# Patient Record
Sex: FEMALE | Race: WHITE | NOT HISPANIC OR LATINO
[De-identification: names, ages, dates, MRNs, and addresses within clinical notes are randomized per-mention and may not be internally consistent; named-entity substitution may affect disease eponyms.]

---

## 2017-01-30 ENCOUNTER — APPOINTMENT (OUTPATIENT)
Dept: UROGYNECOLOGY | Facility: CLINIC | Age: 42
End: 2017-01-30

## 2017-02-01 ENCOUNTER — RESULT REVIEW (OUTPATIENT)
Age: 42
End: 2017-02-01

## 2017-02-01 LAB — BACTERIA UR CULT: NORMAL

## 2017-02-08 ENCOUNTER — OUTPATIENT (OUTPATIENT)
Dept: OUTPATIENT SERVICES | Facility: HOSPITAL | Age: 42
LOS: 1 days | End: 2017-02-08

## 2017-02-08 VITALS
RESPIRATION RATE: 16 BRPM | HEIGHT: 66.5 IN | TEMPERATURE: 98 F | WEIGHT: 162.04 LBS | DIASTOLIC BLOOD PRESSURE: 70 MMHG | HEART RATE: 76 BPM | SYSTOLIC BLOOD PRESSURE: 108 MMHG

## 2017-02-08 DIAGNOSIS — N39.3 STRESS INCONTINENCE (FEMALE) (MALE): ICD-10-CM

## 2017-02-08 LAB
BASOPHILS # BLD AUTO: 0.03 K/UL — SIGNIFICANT CHANGE UP (ref 0–0.2)
BASOPHILS NFR BLD AUTO: 0.5 % — SIGNIFICANT CHANGE UP (ref 0–2)
BLD GP AB SCN SERPL QL: NEGATIVE — SIGNIFICANT CHANGE UP
EOSINOPHIL # BLD AUTO: 0.16 K/UL — SIGNIFICANT CHANGE UP (ref 0–0.5)
EOSINOPHIL NFR BLD AUTO: 2.5 % — SIGNIFICANT CHANGE UP (ref 0–6)
HCG SERPL-ACNC: < 5 MIU/ML — SIGNIFICANT CHANGE UP
HCT VFR BLD CALC: 37.8 % — SIGNIFICANT CHANGE UP (ref 34.5–45)
HGB BLD-MCNC: 13.1 G/DL — SIGNIFICANT CHANGE UP (ref 11.5–15.5)
IMM GRANULOCYTES NFR BLD AUTO: 0.3 % — SIGNIFICANT CHANGE UP (ref 0–1.5)
LYMPHOCYTES # BLD AUTO: 2.52 K/UL — SIGNIFICANT CHANGE UP (ref 1–3.3)
LYMPHOCYTES # BLD AUTO: 39.8 % — SIGNIFICANT CHANGE UP (ref 13–44)
MCHC RBC-ENTMCNC: 32 PG — SIGNIFICANT CHANGE UP (ref 27–34)
MCHC RBC-ENTMCNC: 34.7 % — SIGNIFICANT CHANGE UP (ref 32–36)
MCV RBC AUTO: 92.4 FL — SIGNIFICANT CHANGE UP (ref 80–100)
MONOCYTES # BLD AUTO: 0.27 K/UL — SIGNIFICANT CHANGE UP (ref 0–0.9)
MONOCYTES NFR BLD AUTO: 4.3 % — SIGNIFICANT CHANGE UP (ref 2–14)
NEUTROPHILS # BLD AUTO: 3.33 K/UL — SIGNIFICANT CHANGE UP (ref 1.8–7.4)
NEUTROPHILS NFR BLD AUTO: 52.6 % — SIGNIFICANT CHANGE UP (ref 43–77)
PLATELET # BLD AUTO: 255 K/UL — SIGNIFICANT CHANGE UP (ref 150–400)
PMV BLD: 11.3 FL — SIGNIFICANT CHANGE UP (ref 7–13)
RBC # BLD: 4.09 M/UL — SIGNIFICANT CHANGE UP (ref 3.8–5.2)
RBC # FLD: 12.6 % — SIGNIFICANT CHANGE UP (ref 10.3–14.5)
RH IG SCN BLD-IMP: POSITIVE — SIGNIFICANT CHANGE UP
WBC # BLD: 6.33 K/UL — SIGNIFICANT CHANGE UP (ref 3.8–10.5)
WBC # FLD AUTO: 6.33 K/UL — SIGNIFICANT CHANGE UP (ref 3.8–10.5)

## 2017-02-08 RX ORDER — SODIUM CHLORIDE 9 MG/ML
1000 INJECTION, SOLUTION INTRAVENOUS
Qty: 0 | Refills: 0 | Status: DISCONTINUED | OUTPATIENT
Start: 2017-02-16 | End: 2017-02-17

## 2017-02-08 NOTE — H&P PST ADULT - PMH
Anemia  Dx 1/10 Rx with Iron   ( R/t Fibroid)  Eczema  Hands ,face --seasonal  History of Varicose Veins  both legs  HPV (Human Papillomavirus)  Dx 5 yrs ago  Murmur, Heart  Dx 10 yrs ago.  asymptomatic  Sinus headaches Anemia  Dx 1/10 Rx with Iron   ( R/t Fibroid)  Eczema  Hands ,face --seasonal  History of Varicose Veins  both legs  HPV (Human Papillomavirus)  Dx 5 yrs ago  Murmur, Heart  Dx 10 yrs ago.  asymptomatic  Sinus headaches    Stress incontinence in female

## 2017-02-08 NOTE — H&P PST ADULT - PSH
Abnormal Cervical Cytology  Dx with HPV and Sx done 2004 by Dr. Richards.  s/p cryosurgery  S/P Bunionectomy  L foot 2002  S/P myomectomy  2009 Abnormal Cervical Cytology  Dx with HPV and Sx done 2004 by Dr. Richards.  s/p cryosurgery at that time  S/P Bunionectomy  L foot 2002  S/P myomectomy  2009

## 2017-02-08 NOTE — H&P PST ADULT - ASSESSMENT
42 y/o female with hx of stress incontinence x 1 year,  which has  been worsening. Dx with stress incontinence scheduled for midurethral sling, cystoscopy 2/16/17.

## 2017-02-08 NOTE — H&P PST ADULT - GENITOURINARY COMMENTS
hx of stress incontinence x 1 year, and has recently been worsening. Dx with stress incontinence scheduled for midurethral sling, cystoscopy 2/16/17.

## 2017-02-08 NOTE — H&P PST ADULT - NSANTHOSAYNRD_GEN_A_CORE
No. DAVID screening performed.  STOP BANG Legend: 0-2 = LOW Risk; 3-4 = INTERMEDIATE Risk; 5-8 = HIGH Risk

## 2017-02-08 NOTE — H&P PST ADULT - HISTORY OF PRESENT ILLNESS
42 y/o female with hx of stress incontinence x 1 year, and has recently been worsening. Dx with stress incontinence scheduled for midurethral sling, cystoscopy 2/16/17. 42 y/o female with hx of stress incontinence x 1 year,  which has  been worsening. Dx with stress incontinence scheduled for midurethral sling, cystoscopy 2/16/17.

## 2017-02-15 NOTE — ASU PATIENT PROFILE, ADULT - PSH
Abnormal Cervical Cytology  Dx with HPV and Sx done 2004 by Dr. Richards.  s/p cryosurgery at that time  S/P Bunionectomy  L foot 2002  S/P myomectomy  2009

## 2017-02-15 NOTE — ASU PATIENT PROFILE, ADULT - PMH
Anemia  Dx 1/10 Rx with Iron   ( R/t Fibroid)  Eczema  Hands ,face --seasonal  History of Varicose Veins  both legs  HPV (Human Papillomavirus)  Dx 5 yrs ago  Murmur, Heart  Dx 10 yrs ago.  asymptomatic  Sinus headaches    Stress incontinence in female

## 2017-02-16 ENCOUNTER — TRANSCRIPTION ENCOUNTER (OUTPATIENT)
Age: 42
End: 2017-02-16

## 2017-02-16 ENCOUNTER — APPOINTMENT (OUTPATIENT)
Dept: UROGYNECOLOGY | Facility: HOSPITAL | Age: 42
End: 2017-02-16

## 2017-02-16 ENCOUNTER — OUTPATIENT (OUTPATIENT)
Dept: OUTPATIENT SERVICES | Facility: HOSPITAL | Age: 42
LOS: 1 days | Discharge: HOME | End: 2017-02-16

## 2017-02-16 VITALS
RESPIRATION RATE: 18 BRPM | HEART RATE: 77 BPM | DIASTOLIC BLOOD PRESSURE: 63 MMHG | OXYGEN SATURATION: 98 % | TEMPERATURE: 98 F | SYSTOLIC BLOOD PRESSURE: 106 MMHG

## 2017-02-16 VITALS
DIASTOLIC BLOOD PRESSURE: 56 MMHG | SYSTOLIC BLOOD PRESSURE: 111 MMHG | TEMPERATURE: 98 F | WEIGHT: 162.04 LBS | RESPIRATION RATE: 16 BRPM | HEIGHT: 66.5 IN | HEART RATE: 71 BPM | OXYGEN SATURATION: 97 %

## 2017-02-16 DIAGNOSIS — N39.3 STRESS INCONTINENCE (FEMALE) (MALE): ICD-10-CM

## 2017-02-16 RX ORDER — FENTANYL CITRATE 50 UG/ML
25 INJECTION INTRAVENOUS
Qty: 0 | Refills: 0 | Status: DISCONTINUED | OUTPATIENT
Start: 2017-02-16 | End: 2017-02-17

## 2017-02-16 RX ORDER — OXYCODONE HYDROCHLORIDE 5 MG/1
5 TABLET ORAL EVERY 4 HOURS
Qty: 0 | Refills: 0 | Status: DISCONTINUED | OUTPATIENT
Start: 2017-02-16 | End: 2017-02-17

## 2017-02-16 RX ORDER — SODIUM CHLORIDE 9 MG/ML
1000 INJECTION, SOLUTION INTRAVENOUS
Qty: 0 | Refills: 0 | Status: DISCONTINUED | OUTPATIENT
Start: 2017-02-16 | End: 2017-02-17

## 2017-02-16 RX ORDER — ONDANSETRON 8 MG/1
4 TABLET, FILM COATED ORAL ONCE
Qty: 0 | Refills: 0 | Status: DISCONTINUED | OUTPATIENT
Start: 2017-02-16 | End: 2017-02-17

## 2017-02-16 RX ORDER — FENTANYL CITRATE 50 UG/ML
50 INJECTION INTRAVENOUS
Qty: 0 | Refills: 0 | Status: DISCONTINUED | OUTPATIENT
Start: 2017-02-16 | End: 2017-02-17

## 2017-02-16 RX ADMIN — SODIUM CHLORIDE 30 MILLILITER(S): 9 INJECTION, SOLUTION INTRAVENOUS at 07:44

## 2017-02-16 NOTE — ASU DISCHARGE PLAN (ADULT/PEDIATRIC). - MEDICATION SUMMARY - MEDICATIONS TO TAKE
I will START or STAY ON the medications listed below when I get home from the hospital:    hydrocortisone topical cream  --   apply on skin to effected area of skin prn  -- Indication: For home med    Motrin 800 mg oral tablet  -- 1 tab(s) by mouth 3 times a day  -- Indication: For pain    Aleve 220 mg oral tablet  --  by mouth 1x/day. last dose 2/8/17  -- Indication: For home med     multivitamin  --   1 tab oral dakily. last dose 2/8/17  -- Indication: For home med

## 2017-02-16 NOTE — ASU DISCHARGE PLAN (ADULT/PEDIATRIC). - NURSING INSTRUCTIONS
Tylenol 1000 mg every 6 hours as needed for pain may take at 1:55 pm or motrin 800 mg every 6 hours as needed may take at 3:15 pm Tylenol 1000 mg every 6 hours as needed for pain may take at 1:55 pm or motrin 800 mg every 6 hours as needed may take at 3:15 pm do not take motrin with aleve

## 2017-03-06 ENCOUNTER — APPOINTMENT (OUTPATIENT)
Dept: UROGYNECOLOGY | Facility: CLINIC | Age: 42
End: 2017-03-06

## 2017-04-11 ENCOUNTER — APPOINTMENT (OUTPATIENT)
Dept: UROGYNECOLOGY | Facility: CLINIC | Age: 42
End: 2017-04-11

## 2017-04-11 DIAGNOSIS — N95.2 POSTMENOPAUSAL ATROPHIC VAGINITIS: ICD-10-CM

## 2017-04-24 ENCOUNTER — APPOINTMENT (OUTPATIENT)
Dept: UROGYNECOLOGY | Facility: CLINIC | Age: 42
End: 2017-04-24

## 2017-05-12 ENCOUNTER — APPOINTMENT (OUTPATIENT)
Dept: UROGYNECOLOGY | Facility: CLINIC | Age: 42
End: 2017-05-12

## 2017-06-07 ENCOUNTER — OUTPATIENT (OUTPATIENT)
Dept: OUTPATIENT SERVICES | Facility: HOSPITAL | Age: 42
LOS: 1 days | End: 2017-06-07
Payer: COMMERCIAL

## 2017-06-07 VITALS
OXYGEN SATURATION: 98 % | TEMPERATURE: 98 F | HEART RATE: 84 BPM | DIASTOLIC BLOOD PRESSURE: 60 MMHG | WEIGHT: 160.06 LBS | SYSTOLIC BLOOD PRESSURE: 110 MMHG | RESPIRATION RATE: 16 BRPM | HEIGHT: 67 IN

## 2017-06-07 DIAGNOSIS — N39.3 STRESS INCONTINENCE (FEMALE) (MALE): ICD-10-CM

## 2017-06-07 DIAGNOSIS — Z41.9 ENCOUNTER FOR PROCEDURE FOR PURPOSES OTHER THAN REMEDYING HEALTH STATE, UNSPECIFIED: Chronic | ICD-10-CM

## 2017-06-07 DIAGNOSIS — Z01.818 ENCOUNTER FOR OTHER PREPROCEDURAL EXAMINATION: ICD-10-CM

## 2017-06-07 DIAGNOSIS — T19.2XXA FOREIGN BODY IN VULVA AND VAGINA, INITIAL ENCOUNTER: ICD-10-CM

## 2017-06-07 LAB
HCT VFR BLD CALC: 36.3 % — SIGNIFICANT CHANGE UP (ref 34.5–45)
HGB BLD-MCNC: 12.5 G/DL — SIGNIFICANT CHANGE UP (ref 11.5–15.5)
MCHC RBC-ENTMCNC: 32.1 PG — SIGNIFICANT CHANGE UP (ref 27–34)
MCHC RBC-ENTMCNC: 34.4 GM/DL — SIGNIFICANT CHANGE UP (ref 32–36)
MCV RBC AUTO: 93.1 FL — SIGNIFICANT CHANGE UP (ref 80–100)
PLATELET # BLD AUTO: 225 K/UL — SIGNIFICANT CHANGE UP (ref 150–400)
RBC # BLD: 3.9 M/UL — SIGNIFICANT CHANGE UP (ref 3.8–5.2)
RBC # FLD: 12.8 % — SIGNIFICANT CHANGE UP (ref 10.3–14.5)
WBC # BLD: 7.15 K/UL — SIGNIFICANT CHANGE UP (ref 3.8–10.5)
WBC # FLD AUTO: 7.15 K/UL — SIGNIFICANT CHANGE UP (ref 3.8–10.5)

## 2017-06-07 PROCEDURE — 87086 URINE CULTURE/COLONY COUNT: CPT

## 2017-06-07 PROCEDURE — 85027 COMPLETE CBC AUTOMATED: CPT

## 2017-06-07 PROCEDURE — G0463: CPT

## 2017-06-07 RX ORDER — IBUPROFEN 200 MG
1 TABLET ORAL
Qty: 0 | Refills: 0 | COMMUNITY

## 2017-06-07 RX ORDER — CEFAZOLIN SODIUM 1 G
2000 VIAL (EA) INJECTION ONCE
Qty: 0 | Refills: 0 | Status: DISCONTINUED | OUTPATIENT
Start: 2017-06-12 | End: 2017-06-27

## 2017-06-07 NOTE — H&P PST ADULT - PSH
Abnormal Cervical Cytology  Dx with HPV and Sx done 2004 by Dr. Richards.  s/p cryosurgery at that time  Elective surgery  vaginal mesh 2/2017  S/P Bunionectomy  L foot 2002  S/P myomectomy  2009

## 2017-06-07 NOTE — H&P PST ADULT - HISTORY OF PRESENT ILLNESS
42 y/o female with hx of stress incontinence x 1 year,  which has  been worsening. Dx with stress incontinence scheduled for midurethral sling, cystoscopy 2/16/17. 40 y/o female with hx of stress incontinence  s/p  midurethral sling, cystoscopy 2/16/17; according to pt sling is sticking out denies any symptoms presents to PST for cystoscopy vaginal mesh revision

## 2017-06-08 LAB
CULTURE RESULTS: NO GROWTH — SIGNIFICANT CHANGE UP
SPECIMEN SOURCE: SIGNIFICANT CHANGE UP

## 2017-06-12 ENCOUNTER — RESULT REVIEW (OUTPATIENT)
Age: 42
End: 2017-06-12

## 2017-06-12 ENCOUNTER — TRANSCRIPTION ENCOUNTER (OUTPATIENT)
Age: 42
End: 2017-06-12

## 2017-06-12 ENCOUNTER — OUTPATIENT (OUTPATIENT)
Dept: OUTPATIENT SERVICES | Facility: HOSPITAL | Age: 42
LOS: 1 days | End: 2017-06-12
Payer: COMMERCIAL

## 2017-06-12 ENCOUNTER — APPOINTMENT (OUTPATIENT)
Dept: UROGYNECOLOGY | Facility: HOSPITAL | Age: 42
End: 2017-06-12

## 2017-06-12 VITALS
OXYGEN SATURATION: 100 % | HEART RATE: 68 BPM | TEMPERATURE: 97 F | RESPIRATION RATE: 16 BRPM | DIASTOLIC BLOOD PRESSURE: 57 MMHG | SYSTOLIC BLOOD PRESSURE: 95 MMHG

## 2017-06-12 VITALS
TEMPERATURE: 98 F | HEART RATE: 66 BPM | SYSTOLIC BLOOD PRESSURE: 102 MMHG | RESPIRATION RATE: 16 BRPM | DIASTOLIC BLOOD PRESSURE: 70 MMHG | WEIGHT: 160.06 LBS | HEIGHT: 67 IN | OXYGEN SATURATION: 100 %

## 2017-06-12 DIAGNOSIS — Z01.818 ENCOUNTER FOR OTHER PREPROCEDURAL EXAMINATION: ICD-10-CM

## 2017-06-12 DIAGNOSIS — T19.2XXA FOREIGN BODY IN VULVA AND VAGINA, INITIAL ENCOUNTER: ICD-10-CM

## 2017-06-12 DIAGNOSIS — Z41.9 ENCOUNTER FOR PROCEDURE FOR PURPOSES OTHER THAN REMEDYING HEALTH STATE, UNSPECIFIED: Chronic | ICD-10-CM

## 2017-06-12 LAB — HCG UR QL: NEGATIVE — SIGNIFICANT CHANGE UP

## 2017-06-12 PROCEDURE — 81025 URINE PREGNANCY TEST: CPT

## 2017-06-12 PROCEDURE — 88300 SURGICAL PATH GROSS: CPT

## 2017-06-12 PROCEDURE — 88300 SURGICAL PATH GROSS: CPT | Mod: 26

## 2017-06-12 PROCEDURE — 57295 REVISE VAG GRAFT VIA VAGINA: CPT

## 2017-06-12 RX ORDER — ACETAMINOPHEN 500 MG
1000 TABLET ORAL ONCE
Qty: 0 | Refills: 0 | Status: DISCONTINUED | OUTPATIENT
Start: 2017-06-12 | End: 2017-06-27

## 2017-06-12 RX ORDER — HYDROMORPHONE HYDROCHLORIDE 2 MG/ML
0.5 INJECTION INTRAMUSCULAR; INTRAVENOUS; SUBCUTANEOUS
Qty: 0 | Refills: 0 | Status: DISCONTINUED | OUTPATIENT
Start: 2017-06-12 | End: 2017-06-12

## 2017-06-12 RX ORDER — SODIUM CHLORIDE 9 MG/ML
3 INJECTION INTRAMUSCULAR; INTRAVENOUS; SUBCUTANEOUS EVERY 8 HOURS
Qty: 0 | Refills: 0 | Status: DISCONTINUED | OUTPATIENT
Start: 2017-06-12 | End: 2017-06-12

## 2017-06-12 RX ORDER — SODIUM CHLORIDE 9 MG/ML
1000 INJECTION, SOLUTION INTRAVENOUS
Qty: 0 | Refills: 0 | Status: DISCONTINUED | OUTPATIENT
Start: 2017-06-12 | End: 2017-06-27

## 2017-06-12 RX ORDER — OXYCODONE HYDROCHLORIDE 5 MG/1
5 TABLET ORAL EVERY 4 HOURS
Qty: 0 | Refills: 0 | Status: DISCONTINUED | OUTPATIENT
Start: 2017-06-12 | End: 2017-06-12

## 2017-06-12 RX ORDER — ONDANSETRON 8 MG/1
4 TABLET, FILM COATED ORAL ONCE
Qty: 0 | Refills: 0 | Status: DISCONTINUED | OUTPATIENT
Start: 2017-06-12 | End: 2017-06-12

## 2017-06-12 RX ORDER — IBUPROFEN 200 MG
1 TABLET ORAL
Qty: 30 | Refills: 0 | OUTPATIENT
Start: 2017-06-12 | End: 2017-06-22

## 2017-06-12 RX ADMIN — SODIUM CHLORIDE 125 MILLILITER(S): 9 INJECTION, SOLUTION INTRAVENOUS at 09:38

## 2017-06-12 RX ADMIN — SODIUM CHLORIDE 3 MILLILITER(S): 9 INJECTION INTRAMUSCULAR; INTRAVENOUS; SUBCUTANEOUS at 05:51

## 2017-06-12 NOTE — ASU DISCHARGE PLAN (ADULT/PEDIATRIC). - MEDICATION SUMMARY - MEDICATIONS TO STOP TAKING
I will STOP taking the medications listed below when I get home from the hospital:    Aleve 220 mg oral tablet  --  by mouth 1x/day. last dose 2/8/17

## 2017-06-12 NOTE — BRIEF OPERATIVE NOTE - OPERATION/FINDINGS
1mm mesh exposure 1.5 cm opening in vaginal incision  normal cystoscopy, no evidence of mesh in bladder or urethra

## 2017-06-12 NOTE — ASU DISCHARGE PLAN (ADULT/PEDIATRIC). - MEDICATION SUMMARY - MEDICATIONS TO TAKE
I will START or STAY ON the medications listed below when I get home from the hospital:    ibuprofen 800 mg oral tablet  -- 1 tab(s) by mouth 3 times a day  -- Do not take this drug if you are pregnant.  It is very important that you take or use this exactly as directed.  Do not skip doses or discontinue unless directed by your doctor.  May cause drowsiness or dizziness.  Obtain medical advice before taking any non-prescription drugs as some may affect the action of this medication.  Take with food or milk.    -- Indication: For pain

## 2017-06-12 NOTE — ASU DISCHARGE PLAN (ADULT/PEDIATRIC). - NOTIFY
Numbness, tingling/Increased Irritability or Sluggishness/Unable to Urinate/Swelling that continues/GYN Fever>100.4/Persistent Nausea and Vomiting/Bleeding that does not stop/Pain not relieved by Medications/Excessive Diarrhea/Numbness, color, or temperature change to extremity/Inability to Tolerate Liquids or Foods

## 2017-06-14 ENCOUNTER — RESULT REVIEW (OUTPATIENT)
Age: 42
End: 2017-06-14

## 2017-06-14 LAB — SURGICAL PATHOLOGY STUDY: SIGNIFICANT CHANGE UP

## 2017-06-30 ENCOUNTER — APPOINTMENT (OUTPATIENT)
Dept: UROGYNECOLOGY | Facility: CLINIC | Age: 42
End: 2017-06-30

## 2017-09-01 ENCOUNTER — APPOINTMENT (OUTPATIENT)
Dept: UROGYNECOLOGY | Facility: CLINIC | Age: 42
End: 2017-09-01
Payer: COMMERCIAL

## 2017-09-01 DIAGNOSIS — Z98.890 OTHER SPECIFIED POSTPROCEDURAL STATES: ICD-10-CM

## 2017-09-01 PROCEDURE — 99024 POSTOP FOLLOW-UP VISIT: CPT

## 2017-12-04 ENCOUNTER — APPOINTMENT (OUTPATIENT)
Dept: UROGYNECOLOGY | Facility: CLINIC | Age: 42
End: 2017-12-04
Payer: COMMERCIAL

## 2017-12-04 PROCEDURE — 99213 OFFICE O/P EST LOW 20 MIN: CPT | Mod: GC

## 2017-12-04 RX ORDER — ESTRADIOL 0.1 MG/G
0.1 CREAM VAGINAL
Qty: 1 | Refills: 3 | Status: DISCONTINUED | COMMUNITY
Start: 2017-04-11 | End: 2017-12-04

## 2018-09-11 PROBLEM — N39.3 STRESS INCONTINENCE (FEMALE) (MALE): Chronic | Status: ACTIVE | Noted: 2017-02-08

## 2018-10-15 ENCOUNTER — APPOINTMENT (OUTPATIENT)
Dept: UROGYNECOLOGY | Facility: CLINIC | Age: 43
End: 2018-10-15
Payer: COMMERCIAL

## 2018-10-15 DIAGNOSIS — N39.3 STRESS INCONTINENCE (FEMALE) (MALE): ICD-10-CM

## 2018-10-15 DIAGNOSIS — R39.15 URGENCY OF URINATION: ICD-10-CM

## 2018-10-15 DIAGNOSIS — R35.0 FREQUENCY OF MICTURITION: ICD-10-CM

## 2018-10-15 PROCEDURE — 99214 OFFICE O/P EST MOD 30 MIN: CPT

## 2018-12-02 ENCOUNTER — TRANSCRIPTION ENCOUNTER (OUTPATIENT)
Age: 43
End: 2018-12-02

## 2019-01-14 ENCOUNTER — APPOINTMENT (OUTPATIENT)
Dept: UROGYNECOLOGY | Facility: CLINIC | Age: 44
End: 2019-01-14

## 2019-02-26 ENCOUNTER — TRANSCRIPTION ENCOUNTER (OUTPATIENT)
Age: 44
End: 2019-02-26

## 2021-04-23 NOTE — H&P PST ADULT - NS SC CAGE ALCOHOL ANNOYED YOU
Cinthya Franks is here today for Establish Care (Arthritis in knees. Years now has bad migraines. Ezcema has been flaring up on hands and knees.Feet have been extremely dry. Cracks on both feet.  ) and Office Visit    Concerns/symptoms: As above  Medications: medications verified and updated  Refills needed today? No     Tobacco history: verified  Advanced Directives: No not on file, not interested.  BP greater than 140/90? No    Patient would like communication of their results via:      Cell Phone:   Telephone Information:   Mobile [phone]     Okay to leave a message containing results? Yes  Preferred language:  English.    Health Maintenance Due   Topic Date Due   • Depression Screening  Never done   • COVID-19 Vaccine (1) Never done   • Cervical Cancer Screening  03/29/2021      Patient is due for the topics as listed above and wishes to proceed with them.  Appt scheduled to perform Cervical cancer screening..    Medicare HRA:              PHQ 2:  Date Adult PHQ 2 Score Adult PHQ 2 Interpretation   4/23/2021 0 No further screening needed       PHQ 9:        no

## 2022-04-28 NOTE — BRIEF OPERATIVE NOTE - ASSISTANT(S)
This can be done during appointment insurance would not cover without documentation Dr. Hameed/Dr. Lozoya

## 2022-10-20 ENCOUNTER — APPOINTMENT (OUTPATIENT)
Dept: BARIATRICS/WEIGHT MGMT | Facility: CLINIC | Age: 47
End: 2022-10-20

## 2022-10-20 VITALS — HEIGHT: 66.5 IN | BODY MASS INDEX: 24.62 KG/M2 | WEIGHT: 155 LBS

## 2022-10-20 DIAGNOSIS — Z13.220 ENCOUNTER FOR SCREENING FOR LIPOID DISORDERS: ICD-10-CM

## 2022-10-20 PROCEDURE — 99203 OFFICE O/P NEW LOW 30 MIN: CPT | Mod: 95

## 2022-10-24 PROBLEM — Z13.220 LIPID SCREENING: Status: ACTIVE | Noted: 2022-10-24

## 2022-10-24 NOTE — ASSESSMENT
[FreeTextEntry1] : BARIATRIC SURGERY HISTORY: none\par OBESITY CO-MORBIDITIES: none\par ANTI-OBESITY MEDICATIONS: none\par OBESITY MEDICATION SIDE EFFECTS: n/a \par \par Plan: \par Reviewed whole food, high fiber, lean animal protein\par For healthiest version would go plant based +/- fish 2-3 days a week \par avoid added fat, sugar, refined carbohydrates\par 3 meals, front load\par regular exercise, goal 1 hour 5 days a week \par will check labs \par \par f/u 4-6 weeks, or prn \par

## 2022-10-24 NOTE — HISTORY OF PRESENT ILLNESS
[FreeTextEntry1] : This is a 47 year female  present in office today for initial weight management visit. PMH: eczema \par Has family history of cardiac disease and dm \par \par \par Patient was referred to us by former patient \par Goal is to learn how to eat healthier, lose 10-15 pounds. 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  \par \par Lives with  and 2 children \par \par Patient Cooks\par She does not eat red, avoiding dairy and gluten \par Breakfast: coffee with milk, oatmeal pb and maple syrup and blueberries \par Lunch: salad, turkey cheese sandwiches  \par Dinner: pizza 1x/week, salads, chicken, bean soup, pasta \par Snack: protein shake, pretzel and hummus \par 3 cups of water\par rare alcohol \par \par Eating out/orders in 1x/week  \par \par Exercise:\par Walks the dog\par Takes the stairs at work \par Use to go to the gym, has membership \par Has exercise bands at home, and [employer] called "[employer]" for at home workouts \par \par Sleep\par 2x/week she will wake up and stay awake \par 8 hours on average \par \par Social: Alcohol, former smoker, quit 15 years ago , denies recreational drugs \par

## 2022-11-03 ENCOUNTER — TRANSCRIPTION ENCOUNTER (OUTPATIENT)
Age: 47
End: 2022-11-03

## 2022-12-07 ENCOUNTER — APPOINTMENT (OUTPATIENT)
Dept: BARIATRICS/WEIGHT MGMT | Facility: CLINIC | Age: 47
End: 2022-12-07

## 2022-12-07 VITALS — HEIGHT: 66.5 IN | BODY MASS INDEX: 23.98 KG/M2 | WEIGHT: 151 LBS

## 2022-12-07 DIAGNOSIS — R63.5 ABNORMAL WEIGHT GAIN: ICD-10-CM

## 2022-12-07 PROCEDURE — 99213 OFFICE O/P EST LOW 20 MIN: CPT | Mod: 95

## 2022-12-12 NOTE — HISTORY OF PRESENT ILLNESS
[FreeTextEntry1] : This is a 47 year old female  being seen obesity followup visit. \par \par Down 4 pounds\par Typically eats 3 meals plus a snack. \par She has been eating dinner earlier, done by 630pm as she goes to bed at 830pm \par She stopped eating dairy, and when she does have dairy she notices affect on body ie: bloat and fatigue \par \par Patient is off of work end of December, she plans to restart a good exercise routine  \par \par Overall feeling very well\par

## 2022-12-12 NOTE — ASSESSMENT
[FreeTextEntry1] : BARIATRIC SURGERY HISTORY: none\par OBESITY CO-MORBIDITIES: none\par ANTI-OBESITY MEDICATIONS: none\par OBESITY MEDICATION SIDE EFFECTS: n/a \par \par Plan: \par continue whole food, high fiber, lean animal protein\par  would go plant based +/- fish 2-3 days a week \par avoid added fat, sugar, refined carbohydrates\par 3 meals, front load\par regular exercise, goal 1 hour 5 days a week \par patient will get blood work with pcp beginning of new year \par \par f/u 6-8 weeks, or prn \par

## 2023-03-01 ENCOUNTER — APPOINTMENT (OUTPATIENT)
Dept: BARIATRICS/WEIGHT MGMT | Facility: CLINIC | Age: 48
End: 2023-03-01

## 2025-06-05 ENCOUNTER — RESULT REVIEW (OUTPATIENT)
Age: 50
End: 2025-06-05